# Patient Record
Sex: FEMALE | Race: WHITE | Employment: UNEMPLOYED | URBAN - METROPOLITAN AREA
[De-identification: names, ages, dates, MRNs, and addresses within clinical notes are randomized per-mention and may not be internally consistent; named-entity substitution may affect disease eponyms.]

---
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
## 2021-01-23 ENCOUNTER — TELEPHONE (OUTPATIENT)
Dept: OBGYN CLINIC | Age: 21
End: 2021-01-23

## 2021-01-23 RX ORDER — NORGESTIMATE AND ETHINYL ESTRADIOL 0.25-0.035
1 KIT ORAL DAILY
Qty: 30 TABLET | Refills: 2 | Status: SHIPPED | OUTPATIENT
Start: 2021-01-23 | Stop reason: SDUPTHER

## 2021-01-26 ENCOUNTER — TELEPHONE (OUTPATIENT)
Dept: OBGYN CLINIC | Age: 21
End: 2021-01-26

## 2021-01-26 NOTE — TELEPHONE ENCOUNTER
----- Message from Latoya Sheffield, Jonny Hardinumalaura Chaudhari sent at 1/25/2021  7:03 PM EST -----  Regarding: annual  Please call patient (you may get her mother) and let her know she does need an Annual for further refills  Has not been seen since 11/2019

## 2021-02-08 NOTE — TELEPHONE ENCOUNTER
Requested Prescriptions     Pending Prescriptions Disp Refills    norgestimate-ethinyl estradiol (MONO-LINYAH) 0.25-35 MG-MCG per tablet 30 tablet 2     Sig: Take 1 tablet by mouth daily       Casaedilma Tsang is requesting a refill on mono-linyah.    Last Annual visit:  12/30/2019  Last OB visit: 12/30/2019  Next OB/Office visit:  2/26/2021  Last prescribing provider:  Marquis Boone

## 2021-02-09 RX ORDER — NORGESTIMATE AND ETHINYL ESTRADIOL 0.25-0.035
1 KIT ORAL DAILY
Qty: 30 TABLET | Refills: 2 | Status: SHIPPED | OUTPATIENT
Start: 2021-02-09 | End: 2021-02-26 | Stop reason: SDUPTHER

## 2021-02-26 ENCOUNTER — OFFICE VISIT (OUTPATIENT)
Dept: OBGYN CLINIC | Age: 21
End: 2021-02-26
Payer: COMMERCIAL

## 2021-02-26 VITALS
HEIGHT: 63 IN | SYSTOLIC BLOOD PRESSURE: 100 MMHG | HEART RATE: 79 BPM | DIASTOLIC BLOOD PRESSURE: 69 MMHG | BODY MASS INDEX: 23.23 KG/M2 | WEIGHT: 131.1 LBS

## 2021-02-26 DIAGNOSIS — Z01.419 WOMEN'S ANNUAL ROUTINE GYNECOLOGICAL EXAMINATION: Primary | ICD-10-CM

## 2021-02-26 PROCEDURE — G8484 FLU IMMUNIZE NO ADMIN: HCPCS | Performed by: ADVANCED PRACTICE MIDWIFE

## 2021-02-26 PROCEDURE — 99395 PREV VISIT EST AGE 18-39: CPT | Performed by: ADVANCED PRACTICE MIDWIFE

## 2021-02-26 RX ORDER — NORGESTIMATE AND ETHINYL ESTRADIOL 0.25-0.035
1 KIT ORAL DAILY
Qty: 30 TABLET | Refills: 11 | Status: SHIPPED | OUTPATIENT
Start: 2021-02-26 | End: 2021-08-26 | Stop reason: SDUPTHER

## 2021-02-26 SDOH — ECONOMIC STABILITY: TRANSPORTATION INSECURITY
IN THE PAST 12 MONTHS, HAS LACK OF TRANSPORTATION KEPT YOU FROM MEETINGS, WORK, OR FROM GETTING THINGS NEEDED FOR DAILY LIVING?: NO

## 2021-02-26 SDOH — ECONOMIC STABILITY: FOOD INSECURITY: WITHIN THE PAST 12 MONTHS, YOU WORRIED THAT YOUR FOOD WOULD RUN OUT BEFORE YOU GOT MONEY TO BUY MORE.: NEVER TRUE

## 2021-02-26 SDOH — ECONOMIC STABILITY: INCOME INSECURITY: HOW HARD IS IT FOR YOU TO PAY FOR THE VERY BASICS LIKE FOOD, HOUSING, MEDICAL CARE, AND HEATING?: NOT HARD AT ALL

## 2021-02-26 SDOH — ECONOMIC STABILITY: TRANSPORTATION INSECURITY
IN THE PAST 12 MONTHS, HAS THE LACK OF TRANSPORTATION KEPT YOU FROM MEDICAL APPOINTMENTS OR FROM GETTING MEDICATIONS?: NO

## 2021-02-26 SDOH — ECONOMIC STABILITY: FOOD INSECURITY: WITHIN THE PAST 12 MONTHS, THE FOOD YOU BOUGHT JUST DIDN'T LAST AND YOU DIDN'T HAVE MONEY TO GET MORE.: NEVER TRUE

## 2021-02-26 ASSESSMENT — PATIENT HEALTH QUESTIONNAIRE - PHQ9
SUM OF ALL RESPONSES TO PHQ QUESTIONS 1-9: 0
1. LITTLE INTEREST OR PLEASURE IN DOING THINGS: 0
SUM OF ALL RESPONSES TO PHQ QUESTIONS 1-9: 0

## 2021-02-26 ASSESSMENT — ANXIETY QUESTIONNAIRES
3. WORRYING TOO MUCH ABOUT DIFFERENT THINGS: 0-NOT AT ALL
1. FEELING NERVOUS, ANXIOUS, OR ON EDGE: 1-SEVERAL DAYS
5. BEING SO RESTLESS THAT IT IS HARD TO SIT STILL: 0-NOT AT ALL

## 2021-02-26 ASSESSMENT — ENCOUNTER SYMPTOMS
RESPIRATORY NEGATIVE: 1
GASTROINTESTINAL NEGATIVE: 1

## 2021-06-11 ENCOUNTER — HOSPITAL ENCOUNTER (OUTPATIENT)
Dept: MRI IMAGING | Age: 21
Discharge: HOME | End: 2021-06-13
Payer: COMMERCIAL

## 2021-06-11 DIAGNOSIS — M46.1 SACROILIITIS (HCC): ICD-10-CM

## 2021-06-11 DIAGNOSIS — M25.552 LEFT HIP PAIN: ICD-10-CM

## 2021-06-11 DIAGNOSIS — M54.50 LOW BACK PAIN, UNSPECIFIED BACK PAIN LATERALITY, UNSPECIFIED CHRONICITY, UNSPECIFIED WHETHER SCIATICA PRESENT: ICD-10-CM

## 2021-06-11 DIAGNOSIS — M25.552 BILATERAL HIP PAIN: ICD-10-CM

## 2021-06-11 DIAGNOSIS — M25.551 BILATERAL HIP PAIN: ICD-10-CM

## 2021-06-11 PROCEDURE — 73721 MRI JNT OF LWR EXTRE W/O DYE: CPT

## 2021-07-02 NOTE — ED PROVIDER NOTES
[facility]  ED  Emergency Department Encounter  EmergencyMedicine Resident     Pt Gorge Hernández  MRN: [id number]  [place] 2000  Date of evaluation: 11/5/20  PCP:  Janes Underwood MD    CHIEF COMPLAINT       Chief Complaint   Patient presents with    Headache    Loss of Consciousness       HISTORY OF PRESENT ILLNESS  (Location/Symptom, Timing/Onset, Context/Setting, Quality, Duration, Modifying Factors, Severity.)      Summer Simeon is a 24 y.o. female who presents with headache and syncope. For the past couple months patient has been having intermittent headaches, generalized, throbbing, nonradiating, no associated symptoms. Today patient felt nauseous and \"a little bit off,\" went to the bathroom, walked downstairs, had a syncopal event. Patient denies any new pain afterwards, no new head pain, no neck pain, no injuries. Patient denies fevers, chills, neck stiffness, cough, congestion, visual changes, hearing changes, chest pain, shortness of breath, abdominal pain, vomiting, dysuria, hematuria, diarrhea, constipation, melena, hematochezia, lower extremity pain or swelling, rash. Patient denies any family history of aneurysms, no personal history of aneurysms, no history of bleeding/clotting disorder. Patient is a college student, with stress and anxiety. Patient does take birth control pills. PAST MEDICAL / SURGICAL / SOCIAL / FAMILY HISTORY      has a past medical history of Mild persistent asthma and Seasonal allergic rhinitis. has a past surgical history that includes Collison tooth extraction.       Social History     Socioeconomic History    Marital status: Single     Spouse name: Not on file    Number of children: Not on file    Years of education: Not on file    Highest education level: Not on file   Occupational History    Not on file   Social Needs    Financial resource strain: Not on file    Food insecurity     Worry: Not on file Inability: Not on file    Transportation needs     Medical: Not on file     Non-medical: Not on file   Tobacco Use    Smoking status: Never Smoker    Smokeless tobacco: Never Used   Substance and Sexual Activity    Alcohol use: Never     Frequency: Never    Drug use: Never    Sexual activity: Yes   Lifestyle    Physical activity     Days per week: Not on file     Minutes per session: Not on file    Stress: Not on file   Relationships    Social connections     Talks on phone: Not on file     Gets together: Not on file     Attends Worship service: Not on file     Active member of club or organization: Not on file     Attends meetings of clubs or organizations: Not on file     Relationship status: Not on file    Intimate partner violence     Fear of current or ex partner: Not on file     Emotionally abused: Not on file     Physically abused: Not on file     Forced sexual activity: Not on file   Other Topics Concern    Not on file   Social History Narrative    Not on file       Family History   Problem Relation Age of Onset    Allergies Mother        Allergies:  Patient has no known allergies. Home Medications:  Prior to Admission medications    Medication Sig Start Date End Date Taking?  Authorizing Provider   acetaminophen (TYLENOL) 325 MG tablet Take 1 tablet by mouth every 6 hours as needed for Pain 11/5/20  Yes Larry Johnson MD   ibuprofen (IBU) 400 MG tablet Take 1 tablet by mouth every 6 hours as needed for Pain 11/5/20  Yes Larry Johnson MD   norgestimate-ethinyl estradiol [facility] [place]) 0.25-35 MG-MCG per tablet Take 1 tablet by mouth daily 11/2/20 1/31/21  JEFF Terrazas - CEASAR   montelukast (SINGULAIR) 10 MG tablet Take 1 tablet by mouth daily Diagnosis asthma 9/16/20 12/15/20  Jj Cedeno MD   norgestimate-ethinyl estradiol (ORTHO-CYCLEN, 28,) 0.25-35 MG-MCG per tablet Take 1 tablet by mouth daily 12/30/19   Emiliodomero Boast, APRN - CEASAR   ipratropium (ATROVENT HFA) 17 MCG/ACT inhaler Inhale 1 puff into the lungs 3 times daily 7/22/19 7/21/20  Inocencia Santos MD       REVIEW OF SYSTEMS    (2-9 systems for level 4, 10 or more for level 5)      Review of Systems   Positive for nausea, syncope. Patient denies fevers, chills, neck stiffness, cough, congestion, visual changes, hearing changes, chest pain, shortness of breath, abdominal pain, vomiting, dysuria, hematuria, diarrhea, constipation, melena, hematochezia, lower extremity pain or swelling, rash. PHYSICAL EXAM   (up to 7 for level 4, 8 or more for level 5)      INITIAL VITALS:   BP (!) 106/56   Pulse 74   Temp 98.1 °F (36.7 °C) (Infrared)   Resp 14   LMP 10/29/2020   SpO2 99%     Physical Exam  Constitutional:       General: She is not in acute distress. Appearance: Normal appearance. She is well-developed. She is not ill-appearing, toxic-appearing or diaphoretic. Comments: Patient is alert and oriented, openly communicative, no acute distress. HENT:      Head: Normocephalic and atraumatic. Right Ear: External ear normal.      Left Ear: External ear normal.      Nose: Nose normal. No congestion or rhinorrhea. Mouth/Throat:      Mouth: Mucous membranes are moist.      Pharynx: Oropharynx is clear. No oropharyngeal exudate or posterior oropharyngeal erythema. Eyes:      General:         Right eye: No discharge. Left eye: No discharge. Extraocular Movements: Extraocular movements intact. Pupils: Pupils are equal, round, and reactive to light. Neck:      Musculoskeletal: Normal range of motion and neck supple. No neck rigidity or muscular tenderness. Vascular: No JVD. Trachea: No tracheal deviation. Cardiovascular:      Rate and Rhythm: Normal rate and regular rhythm. Pulses: Normal pulses. Heart sounds: Normal heart sounds. No murmur. No friction rub. No gallop. Pulmonary:      Effort: Pulmonary effort is normal. No respiratory distress.       Breath sounds: Normal breath sounds. No stridor. No wheezing, rhonchi or rales. Chest:      Chest wall: No tenderness. Abdominal:      General: There is no distension. Palpations: Abdomen is soft. There is no mass. Tenderness: There is no abdominal tenderness. There is no right CVA tenderness, left CVA tenderness or guarding. Musculoskeletal: Normal range of motion. General: No signs of injury. Right lower leg: No edema. Left lower leg: No edema. Skin:     General: Skin is warm. Capillary Refill: Capillary refill takes less than 2 seconds. Findings: No rash. Neurological:      General: No focal deficit present. Mental Status: She is alert and oriented to person, place, and time. Cranial Nerves: No cranial nerve deficit. Sensory: No sensory deficit. Motor: No weakness.    Psychiatric:         Mood and Affect: Mood normal.         Behavior: Behavior normal.         DIFFERENTIAL  DIAGNOSIS     PLAN (LABS / IMAGING / EKG):  Orders Placed This Encounter   Procedures    Culture, Urine    CBC Auto Differential    Basic Metabolic Panel w/ Reflex to MG    HCG Qualitative, Serum    Urinalysis, reflex to microscopic    Microscopic Urinalysis    Inpatient consult to Cardiology    EKG 12 Lead       MEDICATIONS ORDERED:  Orders Placed This Encounter   Medications    0.9 % sodium chloride bolus    prochlorperazine (COMPAZINE) injection 10 mg    DISCONTD: diphenhydrAMINE (BENADRYL) injection 25 mg    dexamethasone (DECADRON) injection 4 mg    ketorolac (TORADOL) injection 15 mg    acetaminophen (TYLENOL) 325 MG tablet     Sig: Take 1 tablet by mouth every 6 hours as needed for Pain     Dispense:  60 tablet     Refill:  0    ibuprofen (IBU) 400 MG tablet     Sig: Take 1 tablet by mouth every 6 hours as needed for Pain     Dispense:  60 tablet     Refill:  0       DDX: Vasovagal syncope, cardio neurogenic syncope, pots, dysautonomia, anemia, electrolyte abnormality, pregnancy, UTI, dysrhythmia    DIAGNOSTIC RESULTS / EMERGENCY DEPARTMENT COURSE / MDM   LAB RESULTS:  Results for orders placed or performed during the hospital encounter of 11/05/20   CBC Auto Differential   Result Value Ref Range    WBC 5.2 4.5 - 13.5 k/uL    RBC 4.02 3.95 - 5.11 m/uL    Hemoglobin 11.3 (L) 11.9 - 15.1 g/dL    Hematocrit 33.9 (L) 36.3 - 47.1 %    MCV 84.3 82.6 - 102.9 fL    MCH 28.1 25.2 - 33.5 pg    MCHC 33.3 28.4 - 34.8 g/dL    RDW 11.7 (L) 11.8 - 14.4 %    Platelets 923 586 - 098 k/uL    MPV 8.4 8.1 - 13.5 fL    NRBC Automated 0.0 0.0 per 100 WBC    Differential Type NOT REPORTED     Seg Neutrophils 46 34 - 64 %    Lymphocytes 37 25 - 45 %    Monocytes 9 (H) 2 - 8 %    Eosinophils % 7 (H) 1 - 4 %    Basophils 1 0 - 2 %    Immature Granulocytes 0 0 %    Segs Absolute 2.38 1.80 - 8.00 k/uL    Absolute Lymph # 1.93 1.20 - 5.20 k/uL    Absolute Mono # 0.46 0.10 - 1.40 k/uL    Absolute Eos # 0.34 0.00 - 0.44 k/uL    Basophils Absolute 0.05 0.00 - 0.20 k/uL    Absolute Immature Granulocyte <0.03 0.00 - 0.30 k/uL    WBC Morphology NOT REPORTED     RBC Morphology NOT REPORTED     Platelet Estimate NOT REPORTED    Basic Metabolic Panel w/ Reflex to MG   Result Value Ref Range    Glucose 106 (H) 70 - 99 mg/dL    BUN 8 6 - 20 mg/dL    CREATININE 0.66 0.50 - 0.90 mg/dL    Bun/Cre Ratio NOT REPORTED 9 - 20    Calcium 9.0 8.6 - 10.4 mg/dL    Sodium 135 135 - 144 mmol/L    Potassium 3.8 3.7 - 5.3 mmol/L    Chloride 101 98 - 107 mmol/L    CO2 23 20 - 31 mmol/L    Anion Gap 11 9 - 17 mmol/L    GFR Non-African American  >60 mL/min     Pediatric GFR requires additional information. Refer to [facility] website for calculator.     GFR  NOT REPORTED >60 mL/min    GFR Comment          GFR Staging NOT REPORTED    HCG Qualitative, Serum   Result Value Ref Range    hCG Qual NEGATIVE NEGATIVE   Urinalysis, reflex to microscopic   Result Value Ref Range    Color, UA YELLOW YELLOW    Turbidity UA CLOUDY (A) CLEAR    Glucose, Ur NEGATIVE NEGATIVE    Bilirubin Urine NEGATIVE NEGATIVE    Ketones, Urine NEGATIVE NEGATIVE    Specific [place], UA 1.005 1.005 - 1.030    Urine Hgb NEGATIVE NEGATIVE    pH, UA 5.0 5.0 - 8.0    Protein, UA NEGATIVE NEGATIVE    Urobilinogen, Urine Normal Normal    Nitrite, Urine NEGATIVE NEGATIVE    Leukocyte Esterase, Urine TRACE (A) NEGATIVE    Urinalysis Comments NOT REPORTED    Microscopic Urinalysis   Result Value Ref Range    -          WBC, UA 5 TO 10 0 - 5 /HPF    RBC, UA None 0 - 2 /HPF    Casts UA NOT REPORTED 0 - 2 /LPF    Crystals, UA NOT REPORTED None /HPF    Epithelial Cells UA 2 TO 5 0 - 5 /HPF    Renal Epithelial, UA NOT REPORTED 0 /HPF    Bacteria, UA FEW (A) None    Mucus, UA NOT REPORTED None    Trichomonas, UA NOT REPORTED None    Amorphous, UA NOT REPORTED None    Other Observations UA NOT REPORTED NOT REQ. Yeast, UA NOT REPORTED None       IMPRESSION: 52-year-old female with no significant past medical history presenting with syncopal event. HPI physical exam consistent with vasovagal syncope. Patient is PERC negative, no concern for PE. Will assess urinalysis to look for UTI. Will obtain labs to evaluate electrolyte abnormalities, anemia, pregnancy. No indication for CT imaging at this time. RADIOLOGY:      EKG  EKG Interpretation    Interpreted by me    Rhythm: normal sinus   Rate: normal  Axis: normal  Ectopy: none  Conduction: normal  ST Segments: no acute change  T Waves: no acute change  Q Waves: none    Clinical Impression: no acute changes, potentially short NM. All EKG's are interpreted by the Emergency Department Physician who either signs or Co-signs this chart in the absence of a cardiologist.    EMERGENCY DEPARTMENT COURSE:  Patient came to emergency department, HPI and physical exam were conducted. All nursing notes were reviewed. Patient had episode of sinus tachycardia in the 120s, symptomatic, no abnormal rhythm noted.   This episode is concerning for dysautonomia versus POTS versus cardio neurogenic syncope, will have patient follow-up with cardiology as outpatient. Urinalysis found no evidence of UTI, electrolytes within normal limits, no significant anemia. On reassessment, patient has no headache, completely resolved. Had in-depth discussion with patient and mother regarding follow-up, when to return to the emergency department, understood all. Patient remained stable in the emergency department. Gave strict return precautions to the emergency department and discharge patient. Recommend patient follow-up with primary care provider in the next few days for reassessment. Recommended patient follow-up with cardiology as needed. PROCEDURES:      CONSULTS:  IP CONSULT TO CARDIOLOGY    CRITICAL CARE:  Please see attending note    FINAL IMPRESSION      1. Syncope and collapse    2.  Episodic tension-type headache, not intractable          DISPOSITION / PLAN     DISPOSITION Decision To Discharge 11/05/2020 10:25:58 PM      PATIENT REFERRED TO:  Hiwot Barbosa, [address] [facility] [address] [facility]  [phone]    Schedule an appointment as soon as possible for a visit in 2 days  For reassessment    [facility]  [address] [facility] [address]  [phone]  Go to   As needed, If symptoms worsen    German Solis MD  [address] [facility] [address]  [address]  [phone]    Schedule an appointment as soon as possible for a visit   As needed      DISCHARGE MEDICATIONS:  Discharge Medication List as of 11/5/2020 10:38 PM      START taking these medications    Details   acetaminophen (TYLENOL) 325 MG tablet Take 1 tablet by mouth every 6 hours as needed for Pain, Disp-60 tablet,R-0Print      ibuprofen (IBU) 400 MG tablet Take 1 tablet by mouth every 6 hours as needed for Pain, Disp-60 tablet,R-0Print             Gagandeep Birch MD  Emergency Medicine Resident    (Please note that portions of thisnote were completed with a voice recognition program.  Efforts were made to edit the dictations but occasionally words are mis-transcribed.)        Harika Monahan MD  Resident  11/06/20 2013 [Joint Pain] : joint pain [Negative] : Heme/Lymph [FreeTextEntry9] : neck pain, back pain

## 2021-07-16 ENCOUNTER — HOSPITAL ENCOUNTER (OUTPATIENT)
Age: 21
Discharge: HOME | End: 2021-07-16

## 2021-07-16 PROCEDURE — 86481 TB AG RESPONSE T-CELL SUSP: CPT

## 2021-07-20 LAB — T-SPOT TB TEST: NORMAL

## 2021-08-25 ENCOUNTER — TELEPHONE (OUTPATIENT)
Dept: OBGYN CLINIC | Age: 21
End: 2021-08-25

## 2021-08-26 RX ORDER — NORGESTIMATE AND ETHINYL ESTRADIOL 0.25-0.035
1 KIT ORAL DAILY
Qty: 90 TABLET | Refills: 1 | Status: SHIPPED | OUTPATIENT
Start: 2021-08-26 | End: 2022-01-06 | Stop reason: SDUPTHER

## 2021-10-12 ENCOUNTER — OFFICE VISIT (OUTPATIENT)
Dept: FAMILY MEDICINE CLINIC | Age: 21
End: 2021-10-12
Payer: COMMERCIAL

## 2021-10-12 VITALS
WEIGHT: 131 LBS | SYSTOLIC BLOOD PRESSURE: 118 MMHG | BODY MASS INDEX: 23.21 KG/M2 | TEMPERATURE: 97.2 F | HEART RATE: 71 BPM | OXYGEN SATURATION: 98 % | DIASTOLIC BLOOD PRESSURE: 68 MMHG

## 2021-10-12 DIAGNOSIS — Z23 NEED FOR IMMUNIZATION AGAINST INFLUENZA: ICD-10-CM

## 2021-10-12 DIAGNOSIS — Z00.00 ENCOUNTER FOR WELL ADULT EXAM WITHOUT ABNORMAL FINDINGS: Primary | ICD-10-CM

## 2021-10-12 PROCEDURE — 90471 IMMUNIZATION ADMIN: CPT | Performed by: NURSE PRACTITIONER

## 2021-10-12 PROCEDURE — 99395 PREV VISIT EST AGE 18-39: CPT | Performed by: NURSE PRACTITIONER

## 2021-10-12 PROCEDURE — 90674 CCIIV4 VAC NO PRSV 0.5 ML IM: CPT | Performed by: NURSE PRACTITIONER

## 2021-10-12 ASSESSMENT — PATIENT HEALTH QUESTIONNAIRE - PHQ9
SUM OF ALL RESPONSES TO PHQ9 QUESTIONS 1 & 2: 2
2. FEELING DOWN, DEPRESSED OR HOPELESS: 2
SUM OF ALL RESPONSES TO PHQ QUESTIONS 1-9: 2
SUM OF ALL RESPONSES TO PHQ QUESTIONS 1-9: 2
1. LITTLE INTEREST OR PLEASURE IN DOING THINGS: 0
SUM OF ALL RESPONSES TO PHQ QUESTIONS 1-9: 2

## 2021-10-12 NOTE — PROGRESS NOTES
Cecilio Estrella, FNP-C  [address]  [id number] [facility]. [address]  [phone]  [phone]    Krystle Jones is a 22 y.o. 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        Patient Active Problem List   Diagnosis    Seasonal allergic rhinitis     Past Medical History:   Diagnosis Date    Mild persistent asthma     Seasonal allergic rhinitis       Past Surgical History:   Procedure Laterality Date    WISDOM TOOTH EXTRACTION       Family History   Problem Relation Age of Onset    Allergies Mother     Cancer Father 50        brain     Social History     Tobacco Use    Smoking status: Never Smoker    Smokeless tobacco: Never Used   Substance Use Topics    Alcohol use: Never     ALLERGIES:  No Known Allergies       Subjective     · Constitutional:  Negative for activity change, appetite change,unexpected weight change, chills, fever, and fatigue. · HENT: Negative for ear pain, sore throat,  Rhinorrhea, sinus pain, sinus pressure, congestion. · Eyes:  Negative for pain and discharge. · Respiratory:  Negative for chest tightness, shortness of breath, wheezing, and cough. · Cardiovascular:  Negative for chest pain, palpitations and leg swelling. · Gastrointestinal: Negative for abdominal pain, blood in stool, constipation,diarrhea, nausea and vomiting. · Endocrine: Negative for cold intolerance, heat intolerance, polydipsia, polyphagia and polyuria. · Genitourinary: Negative for difficulty urinating, dysuria, flank pain, frequency, hematuria and urgency. · Musculoskeletal: Negative for arthralgias, back pain, joint swelling, myalgias, neck pain and neck stiffness. · Skin: Negative for rash and wound.    · Allergic/Immunologic: Negative for environmental allergies and food allergies. · Neurological:  Negative for dizziness, light-headedness, numbness and headaches. · Hematological:  Negative for adenopathy. Does not bruise/bleed easily. · Psychiatric/Behavioral: Negative for self-injury, sleep disturbance and suicidal ideas. Objective     PHYSICAL EXAM:   · Constitutional: Leela Calzada is oriented to person, place, and time. Vital signs are normal. Appears well-developed and well-nourished. · HEENT:   · Head: Normocephalic and atraumatic. Right Ear: Hearing and external ear normal. TM bulging, no erythema  Canal normal  · Left Ear: Hearing and external ear normal. TM normal Canal normal  · Nose: Nares normal. Septum midline. No drainage or sinus tenderness. Mucosa pink and moist  · Mouth/Throat: Oropharynx- No erythema, no exudate. Uvula midline, no erythema, no edema. Mucous membranes are pink and moist.   · Eyes:PERRL, EOMI, Conjunctiva normal, No discharge. · Neck: Full passive range of motion. Non-tender on palpation. Neck supple. No thyromegaly present. Trachea normal.  · Cardiovascular: Normal rate, regular rhythm, S1, S2, no murmur, no gallop, no friction rub, intact distal pulses. No carotid bruit. No lower extremity edema  · Pulmonary/Chest: Breath sounds are clear throughout, No respiratory distress, No wheezing, No chest tenderness. Effort normal  · Musculoskeletal: Extremities appear regular and symmetric. No evident masses, lesions, foreign bodies, or other abnormalities. No edema. No tenderness on palpation. Joints are stable. Full ROM, strength and tone are within normal limits. · Lymphadenopathy: No lymphadenopathy noted. · Neurological: Alert and oriented to person, place, and time. Normal motor function, Normal sensory function, No focal deficits noted. He has normal strength. · Skin: Skin is warm, dry and intact. No obvious lesions on exposed skin  · Psychiatric: Normal mood and affect.  Speech is normal and behavior is normal.     Nursing note and vitals reviewed. Blood pressure 118/68, pulse 71, temperature 97.2 °F (36.2 °C), temperature source Temporal, weight 131 lb (59.4 kg), SpO2 98 %, not currently breastfeeding. Body mass index is 23.21 kg/m². Wt Readings from Last 3 Encounters:   10/12/21 131 lb (59.4 kg)   02/26/21 131 lb 1.6 oz (59.5 kg) (56 %, Z= 0.16)*   01/20/20 132 lb 3.2 oz (60 kg) (63 %, Z= 0.34)*     * Growth percentiles are based on CDC (Girls, 2-20 Years) data. BP Readings from Last 3 Encounters:   10/12/21 118/68   02/26/21 100/69   11/05/20 (!) 106/56       No results found for this visit on 10/12/21. Completed Orders/Prescriptions   No orders of the defined types were placed in this encounter. Immunizations Administered     Name Date Dose Route    Influenza, MDCK Quadv, IM, PF (Flucelvax 2 yrs and older) 10/12/2021 0.5 mL Intramuscular    Site: Deltoid- Left    Lot: [id number]    NDC: 45877-089-79          AssessmentPlan/Medical Decision Making     1. Encounter for well adult exam without abnormal findings    2. Need for immunization against influenza  - INFLUENZA, MDCK QUADV, 2 YRS AND OLDER, IM, PF, PREFILL SYR OR SDV, 0.5ML (FLUCELVAX QUADV, PF)      Return in about 1 year (around 10/12/2022). 1.  Yvonne received counseling on the following healthy behaviors: nutrition, exercise and medication adherence  2. Patient given educational materials - see patient instructions  3. Was a self-tracking handout given in paper form or via [employer]? No  If yes, see orders or list here. 4.  Discussed use, benefit, and side effects of prescribed medications. Barriers to medication compliance addressed. All patient questions answered. Pt voiced understanding. 5.  Reviewed prior labs, imaging, consultation, follow up, and health maintenance  6. Continue current medications, diet and exercise. 7. Discussed use, benefit, and side effects of prescribed medications.   Barriers to medication compliance addressed. All her questions were answered. Pt voiced understanding. Елена Lenz will continue current medications, diet and exercise. Of the 25 minute duration appointment visit, Mortimer Knee, CNP spent at least 50% of the face-to-face time in counseling, explanation of diagnosis, planning of further management, and answering all questions. Signed:  Mortimer Knee, CNP    This note is created with the assistance of a speech-recognition program.  While intending to generate a document that actually reflects the content of the visit, no guarantees can be provided that every mistake has been identified and corrected by editing.

## 2021-10-15 NOTE — PROGRESS NOTES
Date of Visit: 2/26/2021    SUBJECTIVE:  Chief Complaint   Patient presents with    Annual Exam    Medication Refill     bc       HPI  Arabella Ruiz arrives for annual Well Woman exam.  Patient denies concerns today. Her Patient's last menstrual period was 02/17/2021 (exact date). .   She denies irregular/heavy bleeding and dysmenorrhea. Her periods are regular and last 5 days. She describes them as moderate. Her bowel habits are regular. She denies any bloating. She denies dysuria. She denies urinary leaking. She denies vaginal discharge. She is sexually active with single partner, contraception - OCP (estrogen/progesterone). No new sexual partner (in past 3 months):    is not desiring pregnancy. Depression/Anxiety screen:  PHQ Scores 2/26/2021 9/30/2019   PHQ2 Score 0 0   PHQ9 Score 0 0     Interpretation of Total Score Depression Severity: 1-4 = Minimal depression, 5-9 = Mild depression, 10-14 = Moderate depression, 15-19 = Moderately severe depression, 20-27 = Severe depression  WILFREDO 7 SCORE 2/26/2021   WILFREDO-7 Total Score 2     Interpretation of WILFREDO-7 score: 5-9 = mild anxiety, 10-14 = moderate anxiety, 15+ = severe anxiety. Recommend referral to behavioral health for scores 10 or greater. Review of Systems   Constitutional: Negative. Respiratory: Negative. Cardiovascular: Negative. Gastrointestinal: Negative. Genitourinary: Negative. Psychiatric/Behavioral: Negative.         PREVENTIVE HEALTH SCREENING:   Date of last pap:  NA - deferred        HPV typing/date: NA    Gardisil vaccine:   done    Date of last mammogram: NA  Date of last DEXA scan: NA  Date of last colonoscopy: NA    History of Gestational Diabetes: NA     If Yes, NA  History of Pre-Eclampsia: NA    Preventive screening through PCP: Yes    Family history of Breast, Ovarian, Colon or Uterine Cancer:  Yes     See updated review in Media     Genetic counseling:  Declined      GYNECOLOGIC HISTORY:  Menarche: Yes  Menopause: NA  HT use: NA    STD history: No     Contraception: PO Mono-Linyah  Permanent sterilization: NA      Physical Exam:     Chaperone for Intimate Exam   Chaperone was offered as part of the rooming process. Patient declined and agrees to continue with exam without a chaperone.  Chaperone: NA    Constitutional:   Blood pressure 100/69, pulse 79, height 5' 3\" (1.6 m), weight 131 lb 1.6 oz (59.5 kg), last menstrual period 02/17/2021, not currently breastfeeding. Wt Readings from Last 3 Encounters:   02/26/21 131 lb 1.6 oz (59.5 kg) (56 %, Z= 0.16)*   01/20/20 132 lb 3.2 oz (60 kg) (63 %, Z= 0.34)*   12/30/19 130 lb 3.2 oz (59.1 kg) (60 %, Z= 0.26)*     * Growth percentiles are based on [facility] (Girls, 2-20 Years) data. General Appearance: This is a well-developed, well-nourished and well-groomed female. Alert and not in distress  Skin:  Inspection of the skin revealed no rashes or lesions  Neck and EENT:  No eye discharge and sclera non-icteric  Lips, teeth and gums without lesions and normal dentition  Nares are patent without discharge  Normal external ears are present with no hearing loss  The neck was supple with full range of motion and no masses. The thyroid was not enlarged and had no masses. No enlarged cervical lymph nodes  Lymphatic:   No lymph nodes were palpable in neck, axilla or groin   Neurologic:    Normal speech, no focal findings or movement disorder noted  Respiratory: The lungs were clear to auscultation bilaterally. There were no rales, rhonchi or wheezes. There was good respiratory effort. Cardiovascular: The heart was in a regular rhythm and rate was normal.  No murmur or extra sounds were noted. Breast:  The breasts are normal size and symmetrical.  There are no skin changes with position changes. The nipples are without deviations or discharge. No masses were palpated. No axillary or supraclavicular lymphadenopathy is present.   Back:  Straight with no CVA tenderness present  Abdomen: The abdomen is soft and non-tender. There was no guarding, rebound or rigidity. The bladder was without fullness or tenderness. No hernias were appreciated. Pelvic:  Deferred  Musculoskeletal:   Normal gait. No contractions with normal movement of all extremities. Range of motion appropriate for patient's age. Extremities:  No cyanosis or edema present. No calf tenderness  Neurologic:    Normal speech, no focal findings or movement disorder noted  Psychiatric:  Alert, oriented to time, place, person and situation. There are no mood or affect changes. ASSESSMENT/PLAN:  1.  Women's annual routine gynecological examination  Birth control prescription refill sent     AGE <39 Counseling and Evaluation  Sexuality/Reproductive Planning  [x] Discussed birth control as needed and reviewed ACHES; refills given   [x] Reproductive plan discussed  [x] Preconception/genetic counseling  [x] Sexual function  [x] Discussed STI counseling/prevention: Offered and declined  [x] Discussed Gardisil: Received  Fitness/Nutrition  [x] Physical activity  [] Folic Acid supplementation discussed  Health/Risk Assessment  [x] Discussed annual Well-Woman exams every year; Pap per ASCCP guidelines and testing  [x] Breast self-awareness reinforced  [x] Hereditary Breast/Ovarian Cancer screening   [] Hepatitis C screening  [x] Tobacco & Secondary smoke risks: Non-smoker  [x] Health maintenance through PCP  Psychosocial Evaluation  [] Intimate partner violence screening  [] Depression/Anxiety screening  Cardiovascular Risk Factors  [] Family history  [] Hypertension  [] Dyslipidemia  [] Obesity  [] Diabetes Mellitus  [] Personal hx of PreE, GDM, or PIH  [x] Lifestyle           The patient, Anil Dos Santos,  was seen  for the visit on this date of service by the [facility]  The time component, involved both face-to-face (counseling and education)  and non face-to-face time (care coordination), spent in determining the total time component. She was also counseled on her preventative health maintenance recommendations and follow-up.     Electronically Signed by JEFF Inman CNM No

## 2021-11-18 DIAGNOSIS — H10.9 BACTERIAL CONJUNCTIVITIS: Primary | ICD-10-CM

## 2021-11-18 NOTE — TELEPHONE ENCOUNTER
----- Message from Piper Card sent at 11/18/2021  1:41 PM EST -----  Subject: Message to Provider    QUESTIONS  Information for Provider? pt woke up to day with what she is thinking is   pink eye, white of rt eye are turning red, feels like something is in the   rt eye, has drainage with it. pt would like an antibodies called in.   ---------------------------------------------------------------------------  --------------  CALL BACK INFO  What is the best way for the office to contact you? OK to leave message on   voicemail  Preferred Call Back Phone Number? [phone]  ---------------------------------------------------------------------------  --------------  SCRIPT ANSWERS  Relationship to Patient?  Self

## 2021-11-19 RX ORDER — POLYMYXIN B SULFATE AND TRIMETHOPRIM 1; 10000 MG/ML; [USP'U]/ML
1 SOLUTION OPHTHALMIC EVERY 4 HOURS
Qty: 10 ML | Refills: 0 | Status: SHIPPED | OUTPATIENT
Start: 2021-11-19 | End: 2021-11-29

## 2021-11-19 NOTE — TELEPHONE ENCOUNTER
I have the antibiotic pended, but there is no local pharmacy on file to send it to - please call the patient

## 2021-11-19 NOTE — TELEPHONE ENCOUNTER
Ok. [place] Please see previous response. I pended the antibiotic last night - there is no pharmacy to send it to.

## 2021-12-22 ENCOUNTER — TELEPHONE (OUTPATIENT)
Dept: FAMILY MEDICINE CLINIC | Age: 21
End: 2021-12-22

## 2021-12-22 NOTE — TELEPHONE ENCOUNTER
Pt is a nursing student at [employer]. She has been told that she needs a rapid drug screen done before  January. 4th. Please advise.

## 2021-12-23 NOTE — TELEPHONE ENCOUNTER
The school has contracts with sites - she will need to contact her school for further direction. If it is done at the office, she will be responsible for paying for it and it could be costly.

## 2022-01-05 ENCOUNTER — PATIENT MESSAGE (OUTPATIENT)
Dept: OBGYN CLINIC | Age: 22
End: 2022-01-05

## 2022-01-05 NOTE — TELEPHONE ENCOUNTER
From: Brenda Loco  To: JEFF DiazM  Sent: 1/5/2022 4:56 PM EST  Subject: birth control    I need a 3 month prescription with refills please for my prescription number [id number], monolinyah (not generic) sent to [facility], phone number [phone]. I need it by the week of January 17th.      Thanks in advance,  Elliott Horvath

## 2022-01-06 RX ORDER — NORGESTIMATE AND ETHINYL ESTRADIOL 0.25-0.035
1 KIT ORAL DAILY
Qty: 90 TABLET | Refills: 1 | Status: SHIPPED | OUTPATIENT
Start: 2022-01-06 | End: 2022-03-01 | Stop reason: SDUPTHER

## 2022-03-01 ENCOUNTER — OFFICE VISIT (OUTPATIENT)
Dept: OBGYN CLINIC | Age: 22
End: 2022-03-01
Payer: COMMERCIAL

## 2022-03-01 VITALS
HEART RATE: 83 BPM | WEIGHT: 130.3 LBS | BODY MASS INDEX: 23.09 KG/M2 | HEIGHT: 63 IN | DIASTOLIC BLOOD PRESSURE: 80 MMHG | SYSTOLIC BLOOD PRESSURE: 112 MMHG

## 2022-03-01 DIAGNOSIS — N94.6 DYSMENORRHEA: ICD-10-CM

## 2022-03-01 DIAGNOSIS — Z30.41 ENCOUNTER FOR SURVEILLANCE OF CONTRACEPTIVE PILLS: ICD-10-CM

## 2022-03-01 DIAGNOSIS — Z01.419 WOMEN'S ANNUAL ROUTINE GYNECOLOGICAL EXAMINATION: Primary | ICD-10-CM

## 2022-03-01 PROCEDURE — 99395 PREV VISIT EST AGE 18-39: CPT | Performed by: ADVANCED PRACTICE MIDWIFE

## 2022-03-01 RX ORDER — HYDROXYZINE HYDROCHLORIDE 10 MG/1
10 TABLET, FILM COATED ORAL 3 TIMES DAILY PRN
COMMUNITY

## 2022-03-01 RX ORDER — NORGESTIMATE AND ETHINYL ESTRADIOL 0.25-0.035
1 KIT ORAL DAILY
Qty: 90 TABLET | Refills: 3 | Status: SHIPPED | OUTPATIENT
Start: 2022-03-01

## 2022-03-01 RX ORDER — SERTRALINE HYDROCHLORIDE 25 MG/1
25 TABLET, FILM COATED ORAL DAILY
COMMUNITY
End: 2022-07-13

## 2022-03-01 ASSESSMENT — ANXIETY QUESTIONNAIRES
1. FEELING NERVOUS, ANXIOUS, OR ON EDGE: 0
7. FEELING AFRAID AS IF SOMETHING AWFUL MIGHT HAPPEN: 0
6. BECOMING EASILY ANNOYED OR IRRITABLE: 0
5. BEING SO RESTLESS THAT IT IS HARD TO SIT STILL: 0
3. WORRYING TOO MUCH ABOUT DIFFERENT THINGS: 0
2. NOT BEING ABLE TO STOP OR CONTROL WORRYING: 0
4. TROUBLE RELAXING: 0
GAD7 TOTAL SCORE: 0

## 2022-03-01 ASSESSMENT — ENCOUNTER SYMPTOMS
ALLERGIC/IMMUNOLOGIC NEGATIVE: 1
EYES NEGATIVE: 1
GASTROINTESTINAL NEGATIVE: 1
RESPIRATORY NEGATIVE: 1

## 2022-03-01 ASSESSMENT — PATIENT HEALTH QUESTIONNAIRE - PHQ9
SUM OF ALL RESPONSES TO PHQ QUESTIONS 1-9: 0
SUM OF ALL RESPONSES TO PHQ9 QUESTIONS 1 & 2: 0
SUM OF ALL RESPONSES TO PHQ QUESTIONS 1-9: 0
2. FEELING DOWN, DEPRESSED OR HOPELESS: 0
1. LITTLE INTEREST OR PLEASURE IN DOING THINGS: 0

## 2022-03-01 NOTE — PROGRESS NOTES
[facility] AND GYNECOLOGY  [facility]   [address] [place] [address]  Dept: [phone]    Patient Name: Jossue Cordova  Patient Age: 22 y.o. Date of Visit: 3/1/2022    Subjective  Chief Complaint   Patient presents with    Annual Exam     HPI:  Jossue Cordova is a 22 y.o. female who arrives to office as an established patient for annual exam and med refill. Patient has no concerns today. Patient reports is sexually active with 1 male partner(s). Patient denies pain with sex. Patient denies a history of sexually transmitted infection(s). Patient does not want screening for sexually transmitted infection(s). Reports is on contraception - OCPs. No issues and would like refills. Review of Systems   Constitutional: Negative. HENT: Negative. Eyes: Negative. Respiratory: Negative. Cardiovascular: Negative. Gastrointestinal: Negative. Endocrine: Negative. Genitourinary: Negative for dyspareunia, flank pain, frequency, genital sores, menstrual problem (dysmenorrhea improved with OCP), pelvic pain, vaginal bleeding and vaginal pain. Musculoskeletal: Negative. Skin: Negative. Allergic/Immunologic: Negative. Neurological: Negative. Hematological: Negative. Psychiatric/Behavioral: Negative.       Preventive Health Screening:   Date of last pap: N/A  History of abnormal pap: N/A          HPV typing/date: N/A   Date of last mammogram: N/A   Date of last DEXA scan: N/A   Date of last colonoscopy: N/A    History of Gestational Diabetes: No     If Yes, Glucose screening ordered: N/A    Preventive screening: Yes, with PCP    Family history of Breast, Ovarian, Colon or Uterine Cancer:  Negative screening      If Yes see scanned worksheet    PHQ Scores 3/1/2022 10/12/2021 2/26/2021 9/30/2019   PHQ2 Score 0 2 0 0   PHQ9 Score 0 2 0 0     Interpretation of Total Score Depression Severity: 1-4 = Minimal depression, 5-9 = Mild depression, 10-14 = Moderate depression, 15-19 = Moderately severe depression, 20-27 = Severe depression  WILFREDO 7 SCORE 3/1/2022 2/26/2021   WILFREDO-7 Total Score 0 -   WILFREDO-7 Total Score - 2     Interpretation of WILFREDO-7 score: 5-9 = mild anxiety, 10-14 = moderate anxiety, 15+ = severe anxiety. Recommend referral to behavioral health for scores 10 or greater. Social Determinants of Health:   Social History     Tobacco Use   Smoking Status Never Smoker   Smokeless Tobacco Never Used      Social History     Substance and Sexual Activity   Alcohol Use Never      Social History     Substance and Sexual Activity   Drug Use Never         Financial Resource Strain:     Difficulty of Paying Living Expenses: Not on file         Food Insecurity:     Worried About Running Out of Food in the Last Year: Not on file    Beti of Food in the Last Year: Not on file         Transportation Needs:     Lack of Transportation (Medical): Not on file    Lack of Transportation (Non-Medical): Not on file         Intimate Partner Violence:     Fear of Current or Ex-Partner: Not on file    Emotionally Abused: Not on file    Physically Abused: Not on file    Sexually Abused: Not on file       Objective  Blood pressure 112/80, pulse 83, height 5' 3\" (1.6 m), weight 130 lb 4.8 oz (59.1 kg), last menstrual period 02/16/2022, not currently breastfeeding. Patient's last menstrual period was 02/16/2022 (exact date). Body mass index is 23.08 kg/m².    Current Outpatient Medications on File Prior to Visit   Medication Sig Dispense Refill    sertraline (ZOLOFT) 25 MG tablet Take 25 mg by mouth daily      hydrOXYzine (ATARAX) 10 MG tablet Take 10 mg by mouth 3 times daily as needed for Itching      Cetirizine HCl (ZYRTEC ALLERGY PO) Take by mouth      Montelukast Sodium (SINGULAIR PO) Take by mouth (Patient not taking: Reported on 3/1/2022)      [DISCONTINUED] norgestimate-ethinyl estradiol ([address]) 0.25-35 MG-MCG per tablet Take 1 tablet by mouth daily 30 tablet 2     No current facility-administered medications on file prior to visit. Past Medical History:   Diagnosis Date    Mild persistent asthma     Seasonal allergic rhinitis      Gynecologic History:  Menarche: 14  Monthly menses (days): regular   Length: 5 days  Flow: moderate    Gardasil Series: Yes    Sexually active: Yes  New Sex Partner within 3 months: No  Domestic Violence Screening: negative    STD history: No     Birth control method: Yes OCP    Physical Exam  Vitals reviewed. Constitutional:       General: She is not in acute distress. Appearance: Normal appearance. She is normal weight. She is not ill-appearing, toxic-appearing or diaphoretic. Cardiovascular:      Rate and Rhythm: Normal rate. Pulmonary:      Effort: Pulmonary effort is normal. No respiratory distress. Chest:      Comments: deferred  Genitourinary:     Comments: deferred  Musculoskeletal:      Cervical back: Normal range of motion. Skin:     General: Skin is warm and dry. Capillary Refill: Capillary refill takes less than 2 seconds. Neurological:      Mental Status: She is alert and oriented to person, place, and time. Mental status is at baseline. Psychiatric:         Mood and Affect: Mood normal.         Behavior: Behavior normal.         Thought Content: Thought content normal.         Judgment: Judgment normal.       Assessment/Plan:  Women's annual routine gynecological examination:  General Health:  [x] Alcohol screening & counseling -negative  [x] Blood pressure screening: normal  [x] Contraceptive counseling & methods: OCP  [x] Depression Screening: Negative  [] Diabetes Screening   [] Folic acid supplementation  [] Healthful diet & activity counseling  [x] Interpersonal violence screening: Negative  [] Lipid screening: not done  [x] Obesity Screening: Body mass index is 23.08 kg/m².   [] Osteoporosis screening  [x] Substance use screening &

## 2022-07-13 ENCOUNTER — OFFICE VISIT (OUTPATIENT)
Dept: FAMILY MEDICINE CLINIC | Age: 22
End: 2022-07-13
Payer: COMMERCIAL

## 2022-07-13 ENCOUNTER — HOSPITAL ENCOUNTER (OUTPATIENT)
Age: 22
Setting detail: SPECIMEN
Discharge: HOME | End: 2022-07-13

## 2022-07-13 VITALS
TEMPERATURE: 97.6 F | OXYGEN SATURATION: 99 % | HEIGHT: 63 IN | DIASTOLIC BLOOD PRESSURE: 68 MMHG | WEIGHT: 133 LBS | BODY MASS INDEX: 23.57 KG/M2 | HEART RATE: 76 BPM | SYSTOLIC BLOOD PRESSURE: 108 MMHG

## 2022-07-13 DIAGNOSIS — Z23 IMMUNIZATION DUE: ICD-10-CM

## 2022-07-13 DIAGNOSIS — Z01.84 IMMUNITY STATUS TESTING: ICD-10-CM

## 2022-07-13 DIAGNOSIS — Z91.09 ENVIRONMENTAL ALLERGIES: ICD-10-CM

## 2022-07-13 DIAGNOSIS — Z00.00 ENCOUNTER FOR WELL ADULT EXAM WITHOUT ABNORMAL FINDINGS: Primary | ICD-10-CM

## 2022-07-13 DIAGNOSIS — Z13.31 DEPRESSION SCREENING NEGATIVE: ICD-10-CM

## 2022-07-13 PROCEDURE — G0444 DEPRESSION SCREEN ANNUAL: HCPCS | Performed by: NURSE PRACTITIONER

## 2022-07-13 PROCEDURE — 99395 PREV VISIT EST AGE 18-39: CPT | Performed by: NURSE PRACTITIONER

## 2022-07-13 PROCEDURE — 90471 IMMUNIZATION ADMIN: CPT | Performed by: NURSE PRACTITIONER

## 2022-07-13 PROCEDURE — 90715 TDAP VACCINE 7 YRS/> IM: CPT | Performed by: NURSE PRACTITIONER

## 2022-07-13 RX ORDER — MONTELUKAST SODIUM 10 MG/1
10 TABLET ORAL DAILY
Qty: 90 TABLET | Refills: 1 | Status: SHIPPED | OUTPATIENT
Start: 2022-07-13

## 2022-07-13 RX ORDER — MONTELUKAST SODIUM 10 MG/1
10 TABLET ORAL DAILY
Qty: 30 TABLET | Refills: 0 | Status: SHIPPED | OUTPATIENT
Start: 2022-07-13

## 2022-07-13 SDOH — ECONOMIC STABILITY: FOOD INSECURITY: WITHIN THE PAST 12 MONTHS, THE FOOD YOU BOUGHT JUST DIDN'T LAST AND YOU DIDN'T HAVE MONEY TO GET MORE.: NEVER TRUE

## 2022-07-13 SDOH — ECONOMIC STABILITY: FOOD INSECURITY: WITHIN THE PAST 12 MONTHS, YOU WORRIED THAT YOUR FOOD WOULD RUN OUT BEFORE YOU GOT MONEY TO BUY MORE.: NEVER TRUE

## 2022-07-13 ASSESSMENT — SOCIAL DETERMINANTS OF HEALTH (SDOH): HOW HARD IS IT FOR YOU TO PAY FOR THE VERY BASICS LIKE FOOD, HOUSING, MEDICAL CARE, AND HEATING?: NOT HARD AT ALL

## 2022-07-13 ASSESSMENT — PATIENT HEALTH QUESTIONNAIRE - PHQ9
SUM OF ALL RESPONSES TO PHQ QUESTIONS 1-9: 0
2. FEELING DOWN, DEPRESSED OR HOPELESS: 0
SUM OF ALL RESPONSES TO PHQ QUESTIONS 1-9: 0
1. LITTLE INTEREST OR PLEASURE IN DOING THINGS: 0
SUM OF ALL RESPONSES TO PHQ9 QUESTIONS 1 & 2: 0
SUM OF ALL RESPONSES TO PHQ QUESTIONS 1-9: 0
SUM OF ALL RESPONSES TO PHQ QUESTIONS 1-9: 0

## 2022-07-13 ASSESSMENT — VISUAL ACUITY: OU: 1

## 2022-07-13 NOTE — PATIENT INSTRUCTIONS
Well Visit, Ages 25 to 48: Care Instructions  Overview     Well visits can help you stay healthy. Your doctor has checked your overall health and may have suggested ways to take good care of yourself. Your doctor also may have recommended tests. At home, you can help prevent illness withhealthy eating, regular exercise, and other steps. Follow-up care is a key part of your treatment and safety. Be sure to make and go to all appointments, and call your doctor if you are having problems. It's also a good idea to know your test results and keep alist of the medicines you take. How can you care for yourself at home?  Get screening tests that you and your doctor decide on. Screening helps find diseases before any symptoms appear.  Eat healthy foods. Choose fruits, vegetables, whole grains, protein, and low-fat dairy foods. Limit fat, especially saturated fat. Reduce salt in your diet.  Limit alcohol. If you are a man, have no more than 2 drinks a day or 14 drinks a week. If you are a woman, have no more than 1 drink a day or 7 drinks a week.  Get at least 30 minutes of physical activity on most days of the week. Walking is a good choice. You also may want to do other activities, such as running, swimming, cycling, or playing tennis or team sports. Discuss any changes in your exercise program with your doctor.  Reach and stay at a healthy weight. This will lower your risk for many problems, such as obesity, diabetes, heart disease, and high blood pressure.  Do not smoke or allow others to smoke around you. If you need help quitting, talk to your doctor about stop-smoking programs and medicines. These can increase your chances of quitting for good.  Care for your mental health. It is easy to get weighed down by worry and stress. Learn strategies to manage stress, like deep breathing and mindfulness, and stay connected with your family and community.  If you find you often feel sad or hopeless, talk with your doctor. Treatment can help.  Talk to your doctor about whether you have any risk factors for sexually transmitted infections (STIs). You can help prevent STIs if you wait to have sex with a new partner (or partners) until you've each been tested for STIs. It also helps if you use condoms (male or female condoms) and if you limit your sex partners to one person who only has sex with you. Vaccines are available for some STIs, such as HPV.  Use birth control if it's important to you to prevent pregnancy. Talk with your doctor about the choices available and what might be best for you.  If you think you may have a problem with alcohol or drug use, talk to your doctor. This includes prescription medicines (such as amphetamines and opioids) and illegal drugs (such as cocaine and methamphetamine). Your doctor can help you figure out what type of treatment is best for you.  Protect your skin from too much sun. When you're outdoors from 10 a.m. to 4 p.m., stay in the shade or cover up with clothing and a hat with a wide brim. Wear sunglasses that block UV rays. Even when it's cloudy, put broad-spectrum sunscreen (SPF 30 or higher) on any exposed skin.  See a dentist one or two times a year for checkups and to have your teeth cleaned.  Wear a seat belt in the car. When should you call for help? Watch closely for changes in your health, and be sure to contact your doctor if you have any problems or symptoms that concern you. Where can you learn more? Go to https://cristiano.healthSocialScipartners. org and sign in to your Tinypass account. Enter P072 in the [facility] box to learn more about \"Well Visit, Ages 25 to 48: Care Instructions. \"     If you do not have an account, please click on the \"Sign Up Now\" link. Current as of: October 6, 2021               Content Version: 13.3  © 2968-3288 Healthwise, Incorporated. Care instructions adapted under license by [facility] ([facility]).  If you have example, you can exercise 3 times a day for 10 or 20 minutes. Moderate exercise is safe for most people, but it is always a good idea to talk to your doctor before starting an exercise program.   Keep moving. Carlotta [place] the lawn, work in the garden, or [email or web address]. Take the stairs instead of the elevator at work.  If you smoke, quit. People who smoke have an increased risk for heart attack, stroke, cancer, and other lung illnesses. Quitting is hard, but there are ways to boost your chance of quitting tobacco for good. ? Use nicotine gum, patches, or lozenges. ? Ask your doctor about stop-smoking programs and medicines. ? Keep trying. In addition to reducing your risk of diseases in the future, you will notice some benefits soon after you stop using tobacco. If you have shortness of breath or asthma symptoms, they will likely get better within a few weeks after you quit.  Limit how much alcohol you drink. Moderate amounts of alcohol (up to 2 drinks a day for men, 1 drink a day for women) are okay. But drinking too much can lead to liver problems, high blood pressure, and other health problems. Family health  If you have a family, there are many things you can do together to improve yourhealth.  Eat meals together as a family as often as possible.  Eat healthy foods. This includes fruits, vegetables, lean meats and dairy, and whole grains.  Include your family in your fitness plan. Most people think of activities such as jogging or tennis as the way to fitness, but there are many ways you and your family can be more active. Anything that makes you breathe hard and gets your heart pumping is exercise. Here are some tips:  ? Walk to do errands or to take your child to school or the bus.  ? Go for a family bike ride after dinner instead of watching TV. Where can you learn more? Go to https://chmarshalleb.health-partners. org and sign in to your [employer] account.  Enter G533 in the [facility] box to learn more about \"A Healthy Lifestyle: Care Instructions. \"     If you do not have an account, please click on the \"Sign Up Now\" link. Current as of: February 9, 2022               Content Version: 13.3  © 5403-2420 Healthwise, Incorporated. Care instructions adapted under license by [facility] ([facility]). If you have questions about a medical condition or this instruction, always ask your healthcare professional. [address] any warranty or liability for your use of this information.

## 2022-07-13 NOTE — PROGRESS NOTES
Well Adult Note  Name: Rolanda Zuñiga Date: 2022   MRN: [id number] Sex: Female   Age: 22 y.o. Ethnicity: Non- / Non    : 2000 Race: White (non-)      Peggy Villafuerte is here for well adult exam.  History:    Environmental allergies   - several year h/o allergy symptoms including congestion, rhinorrhea, sneezing, dry, itchy eyes   - she takes Claritin daily and singulair when not controlled with claritin      Review of Systems   All other systems reviewed and are negative. No Known Allergies      Prior to Visit Medications    Medication Sig Taking?  Authorizing Provider   sertraline (ZOLOFT) 50 MG tablet  Yes Historical Provider, MD   montelukast (SINGULAIR) 10 MG tablet Take 1 tablet by mouth daily Yes JEFF Mantilla CNP   montelukast (SINGULAIR) 10 MG tablet Take 1 tablet by mouth daily Yes JEFF Steele CNP   norgestimate-ethinyl estradiol ([address]) 0.25-35 MG-MCG per tablet Take 1 tablet by mouth daily Yes JEFF Dsouza CNM   hydrOXYzine (ATARAX) 10 MG tablet Take 10 mg by mouth 3 times daily as needed for Itching  Yes Historical Provider, MD   Cetirizine HCl (ZYRTEC ALLERGY PO) Take by mouth Yes Historical Provider, MD   norgestimate-ethinyl estradiol ([address]) 0.25-35 MG-MCG per tablet Take 1 tablet by mouth daily  Adán Quezada, APRN - CNM         Past Medical History:   Diagnosis Date    Mild persistent asthma     Seasonal allergic rhinitis        Past Surgical History:   Procedure Laterality Date    WISDOM TOOTH EXTRACTION           Family History   Problem Relation Age of Onset    Allergies Mother     Cancer Father 50        brain    Cancer Other        Social History     Tobacco Use    Smoking status: Never Smoker    Smokeless tobacco: Never Used   Vaping Use    Vaping Use: Never used   Substance Use Topics    Alcohol use: Never    Drug use: Never       Objective   /68   Pulse 76   Temp 97.6 °F (36.4 °C) (Temporal) Ht 5' 3\" (1.6 m)   Wt 133 lb (60.3 kg)   SpO2 99%   BMI 23.56 kg/m²   Wt Readings from Last 3 Encounters:   07/13/22 133 lb (60.3 kg)   03/01/22 130 lb 4.8 oz (59.1 kg)   10/12/21 131 lb (59.4 kg)     There were no vitals filed for this visit. Physical Exam  Vitals and nursing note reviewed. Constitutional:       General: She is not in acute distress. Appearance: She is well-developed and normal weight. HENT:      Head: Normocephalic and atraumatic. Right Ear: Hearing, tympanic membrane, ear canal and external ear normal.      Left Ear: Hearing, tympanic membrane, ear canal and external ear normal.      Nose: Nose normal.      Mouth/Throat:      Lips: Pink. Mouth: Mucous membranes are moist.      Tongue: No lesions. Pharynx: Oropharynx is clear. Uvula midline. Eyes:      General: Lids are normal. Vision grossly intact. Gaze aligned appropriately. Extraocular Movements: Extraocular movements intact. Conjunctiva/sclera: Conjunctivae normal.      Pupils: Pupils are equal, round, and reactive to light. Neck:      Thyroid: No thyroid mass, thyromegaly or thyroid tenderness. Trachea: Trachea normal.   Cardiovascular:      Rate and Rhythm: Normal rate and regular rhythm. Pulses: Normal pulses. Heart sounds: No murmur heard. Pulmonary:      Effort: Pulmonary effort is normal.      Breath sounds: Normal breath sounds. No wheezing. Abdominal:      General: Abdomen is flat. Bowel sounds are normal.      Palpations: Abdomen is soft. Tenderness: There is no abdominal tenderness. Musculoskeletal:         General: No swelling or tenderness. Normal range of motion. Cervical back: Full passive range of motion without pain. Right lower leg: No edema. Left lower leg: No edema. Lymphadenopathy:      Cervical: No cervical adenopathy. Skin:     General: Skin is warm and dry. Capillary Refill: Capillary refill takes less than 2 seconds. Neurological:      General: No focal deficit present. Mental Status: She is alert and oriented to person, place, and time. Sensory: Sensation is intact. Motor: Motor function is intact. Coordination: Coordination is intact. Psychiatric:         Attention and Perception: Attention and perception normal.         Mood and Affect: Mood and affect normal.         Speech: Speech normal.         Behavior: Behavior is cooperative. Thought Content: Thought content normal.         Cognition and Memory: Cognition and memory normal.         Judgment: Judgment normal.           Assessment   Plan   1. Encounter for well adult exam without abnormal findings  -     Tdap, BOOSTRIX, (age 8 yrs+), IM  2. Immunization due  -     Tdap, BOOSTRIX, (age 8 yrs+), IM  3. Environmental allergies  -     montelukast (SINGULAIR) 10 MG tablet; Take 1 tablet by mouth daily, Disp-90 tablet, R-1Normal  -     montelukast (SINGULAIR) 10 MG tablet; Take 1 tablet by mouth daily, Disp-30 tablet, R-0Normal  4. Immunity status testing  -     Quantiferon (R) TB Gold, (Incubated); Future  5.  Depression screening negative  -     [place] DEPRESSION SCREEN ANNUAL         Personalized Preventive Plan   Current Health Maintenance Status  Immunization History   Administered Date(s) Administered    COVID[facility], Primary or Immunocompromised, (age 12y+), IM, 100 mcg/0.5mL 01/06/2021, 02/03/2021, 12/23/2021    DTaP 2001, 02/19/2002, 12/10/2003    DTaP vaccine 2001, 09/14/2006    HIB PRP-T (ActHIB, Hiberix) 2001, 02/19/2002, 03/17/2003    HPV Quadrivalent (Gardasil) 12/09/2011, 06/22/2012, 08/28/2013    Hepatitis A Ped/Adol (Havrix, Vaqta) 11/14/2006, 09/24/2007    Hepatitis B 2001, 2001, 2001, 09/15/2014    Hepatitis B Ped/Adol (Engerix-B, Recombivax HB) 09/05/2014    Hib (HbOC) 2001    Influenza Virus Vaccine 10/16/2020    Influenza Whole 11/14/2006, 10/23/2007, 11/28/2007, 11/20/2008, 09/04/2009, 11/09/2016    Influenza, MDCK Quadv, IM, PF (Flucelvax 2 yrs and older) 10/12/2021    Influenza, Brennen Cuna, IM, PF (6 mo and older Fluzone, Flulaval, Fluarix, and 3 yrs and older Afluria) 10/13/2017, 10/11/2019    MMR 08/30/2002, 01/06/2021    MMRV (ProQuad) 09/14/2006    Meningococcal MCV4P (Menactra) 08/29/2012, 10/13/2017    Pneumococcal Conjugate 13-valent (Suzzanne Marrow) 12/10/2002    Polio IPV (IPOL) 2001, 2001, 05/23/2002, 09/14/2006    Tdap (Boostrix, Adacel) 08/29/2012, 07/13/2022    Varicella (Varivax) 08/30/2002        Health Maintenance   Topic Date Due    HIV screen  Never done    Chlamydia screen  09/30/2020    Flu vaccine (1) 09/01/2022    Depression Screen  03/01/2023    DTaP/Tdap/Td vaccine (8 - Td or Tdap) 07/13/2032    Hepatitis A vaccine  Completed    Hepatitis B vaccine  Completed    Hib vaccine  Completed    HPV vaccine  Completed    Varicella vaccine  Completed    Meningococcal (ACWY) vaccine  Completed    COVID-19 Vaccine  Completed    Hepatitis C screen  Completed    Pneumococcal 0-64 years Vaccine  Aged Out     Recommendations for Preventive Services Due: see orders and patient instructions/AVS.    Return in about 1 year (around 7/13/2023).

## 2022-07-16 LAB
QUANTI TB GOLD PLUS: NEGATIVE
QUANTI TB1 MINUS NIL: 0 IU/ML (ref 0–0.34)
QUANTI TB2 MINUS NIL: 0 IU/ML (ref 0–0.34)
QUANTIFERON MITOGEN: 2.91 IU/ML
QUANTIFERON NIL: 0.01 IU/ML

## 2022-08-07 DIAGNOSIS — Z91.09 ENVIRONMENTAL ALLERGIES: ICD-10-CM

## 2022-08-08 RX ORDER — MONTELUKAST SODIUM 10 MG/1
TABLET ORAL
Qty: 30 TABLET | Refills: 0 | OUTPATIENT
Start: 2022-08-08

## 2023-01-09 ENCOUNTER — HOSPITAL ENCOUNTER (OUTPATIENT)
Age: 23
Setting detail: SPECIMEN
Discharge: HOME | End: 2023-01-09

## 2023-01-09 LAB
ABSOLUTE EOS #: 0.22 K/UL (ref 0–0.44)
ABSOLUTE IMMATURE GRANULOCYTE: <0.03 K/UL (ref 0–0.3)
ABSOLUTE LYMPH #: 1.49 K/UL (ref 1.1–3.7)
ABSOLUTE MONO #: 0.31 K/UL (ref 0.1–1.4)
ALBUMIN SERPL-MCNC: 3.9 G/DL (ref 3.5–5.2)
ALBUMIN/GLOBULIN RATIO: 1.1 (ref 1–2.5)
ALP BLD-CCNC: 50 U/L (ref 35–104)
ALT SERPL-CCNC: 11 U/L (ref 5–33)
ANION GAP SERPL CALCULATED.3IONS-SCNC: 11 MMOL/L (ref 9–17)
AST SERPL-CCNC: 19 U/L
BASOPHILS # BLD: 1 % (ref 0–2)
BASOPHILS ABSOLUTE: 0.04 K/UL (ref 0–0.2)
BILIRUB SERPL-MCNC: 0.3 MG/DL (ref 0.3–1.2)
BUN BLDV-MCNC: 10 MG/DL (ref 6–20)
CALCIUM SERPL-MCNC: 9.6 MG/DL (ref 8.6–10.4)
CHLORIDE BLD-SCNC: 103 MMOL/L (ref 98–107)
CO2: 24 MMOL/L (ref 20–31)
CREAT SERPL-MCNC: 0.69 MG/DL (ref 0.5–0.9)
EOSINOPHILS RELATIVE PERCENT: 5 % (ref 1–4)
GFR SERPL CREATININE-BSD FRML MDRD: >60 ML/MIN/1.73M2
GLUCOSE BLD-MCNC: 84 MG/DL (ref 70–99)
HCT VFR BLD CALC: 38.4 % (ref 36.3–47.1)
HEMOGLOBIN: 12.4 G/DL (ref 11.9–15.1)
IMMATURE GRANULOCYTES: 0 %
LYMPHOCYTES # BLD: 32 % (ref 25–45)
MCH RBC QN AUTO: 27.5 PG (ref 25.2–33.5)
MCHC RBC AUTO-ENTMCNC: 32.3 G/DL (ref 28.4–34.8)
MCV RBC AUTO: 85.1 FL (ref 82.6–102.9)
MONOCYTES # BLD: 7 % (ref 2–8)
NRBC AUTOMATED: 0 PER 100 WBC
PDW BLD-RTO: 11.8 % (ref 11.8–14.4)
PLATELET # BLD: 426 K/UL (ref 138–453)
PMV BLD AUTO: 8.9 FL (ref 8.1–13.5)
POTASSIUM SERPL-SCNC: 4.5 MMOL/L (ref 3.7–5.3)
RBC # BLD: 4.51 M/UL (ref 3.95–5.11)
SEG NEUTROPHILS: 55 % (ref 34–64)
SEGMENTED NEUTROPHILS ABSOLUTE COUNT: 2.57 K/UL (ref 1.5–8.1)
SODIUM BLD-SCNC: 138 MMOL/L (ref 135–144)
THYROXINE, FREE: 1.04 NG/DL (ref 0.93–1.7)
TOTAL PROTEIN: 7.4 G/DL (ref 6.4–8.3)
TSH SERPL DL<=0.05 MIU/L-ACNC: 0.9 UIU/ML (ref 0.3–5)
VITAMIN D 25-HYDROXY: 36 NG/ML
WBC # BLD: 4.6 K/UL (ref 4.5–13.5)

## 2023-02-16 ENCOUNTER — TELEPHONE (OUTPATIENT)
Dept: OBGYN CLINIC | Age: 23
End: 2023-02-16

## 2023-02-16 DIAGNOSIS — Z30.41 ENCOUNTER FOR SURVEILLANCE OF CONTRACEPTIVE PILLS: ICD-10-CM

## 2023-02-16 DIAGNOSIS — N94.6 DYSMENORRHEA: ICD-10-CM

## 2023-02-16 RX ORDER — NORGESTIMATE AND ETHINYL ESTRADIOL 0.25-0.035
1 KIT ORAL DAILY
Qty: 90 TABLET | Refills: 0 | Status: SHIPPED | OUTPATIENT
Start: 2023-02-16

## 2023-02-16 NOTE — TELEPHONE ENCOUNTER
Patient needs a refill on her OCP- mono-linya 025-35 mg-mcg po daily and patient does not want the generic. Last annual-3/1/22 with you and new annual scheduled for 2/21/23.  Please advise, thanks

## 2023-02-17 DIAGNOSIS — N94.6 DYSMENORRHEA: ICD-10-CM

## 2023-02-17 DIAGNOSIS — Z30.41 ENCOUNTER FOR SURVEILLANCE OF CONTRACEPTIVE PILLS: ICD-10-CM

## 2023-02-17 RX ORDER — NORGESTIMATE AND ETHINYL ESTRADIOL 0.25-0.035
1 KIT ORAL DAILY
Qty: 90 TABLET | Refills: 0 | Status: CANCELLED | OUTPATIENT
Start: 2023-02-17

## 2023-02-23 DIAGNOSIS — Z30.41 ENCOUNTER FOR SURVEILLANCE OF CONTRACEPTIVE PILLS: ICD-10-CM

## 2023-02-23 DIAGNOSIS — N94.6 DYSMENORRHEA: ICD-10-CM

## 2023-02-23 RX ORDER — NORGESTIMATE AND ETHINYL ESTRADIOL 0.25-0.035
1 KIT ORAL DAILY
Qty: 90 TABLET | Refills: 3 | Status: SHIPPED | OUTPATIENT
Start: 2023-02-23

## 2023-02-27 DIAGNOSIS — N94.6 DYSMENORRHEA: ICD-10-CM

## 2023-02-27 DIAGNOSIS — Z30.41 ENCOUNTER FOR SURVEILLANCE OF CONTRACEPTIVE PILLS: ICD-10-CM

## 2023-02-27 RX ORDER — NORGESTIMATE AND ETHINYL ESTRADIOL 0.25-0.035
1 KIT ORAL DAILY
Qty: 90 TABLET | Refills: 3 | Status: SHIPPED | OUTPATIENT
Start: 2023-02-27

## 2023-03-08 ENCOUNTER — TELEPHONE (OUTPATIENT)
Dept: OBGYN CLINIC | Age: 23
End: 2023-03-08

## 2023-03-08 RX ORDER — NORGESTIMATE AND ETHINYL ESTRADIOL 0.25-0.035
1 KIT ORAL DAILY
Qty: 1 PACKET | Refills: 0 | Status: SHIPPED | OUTPATIENT
Start: 2023-03-08

## 2023-03-08 NOTE — TELEPHONE ENCOUNTER
[facility] no longer fills [place] TAB (ANGELICA 1) It needs to got to a local pharmacy.  Please advise

## 2023-03-21 ENCOUNTER — OFFICE VISIT (OUTPATIENT)
Dept: OBGYN CLINIC | Age: 23
End: 2023-03-21
Payer: COMMERCIAL

## 2023-03-21 ENCOUNTER — HOSPITAL ENCOUNTER (OUTPATIENT)
Age: 23
Setting detail: SPECIMEN
Discharge: HOME | End: 2023-03-21

## 2023-03-21 VITALS
HEIGHT: 63 IN | SYSTOLIC BLOOD PRESSURE: 114 MMHG | DIASTOLIC BLOOD PRESSURE: 78 MMHG | WEIGHT: 130 LBS | BODY MASS INDEX: 23.04 KG/M2

## 2023-03-21 DIAGNOSIS — Z30.41 ENCOUNTER FOR BIRTH CONTROL PILLS MAINTENANCE: ICD-10-CM

## 2023-03-21 DIAGNOSIS — R53.83 OTHER FATIGUE: ICD-10-CM

## 2023-03-21 DIAGNOSIS — Z01.419 WELL WOMAN EXAM WITH ROUTINE GYNECOLOGICAL EXAM: Primary | ICD-10-CM

## 2023-03-21 PROCEDURE — 99395 PREV VISIT EST AGE 18-39: CPT | Performed by: CLINICAL NURSE SPECIALIST

## 2023-03-21 RX ORDER — NORGESTIMATE AND ETHINYL ESTRADIOL 0.25-0.035
1 KIT ORAL DAILY
Qty: 1 PACKET | Refills: 11 | Status: SHIPPED | OUTPATIENT
Start: 2023-03-21

## 2023-03-21 NOTE — PROGRESS NOTES
discussed. Neurological:  The patient is alert and oriented to time,place, person, and situation    Skin:  A brief inspection of the skin revealed no rashes or lesions. Neck:  The neck was supple. Respiratory: There was unlabored respiratory effort. Lungs clear to ascultation. Cardiovascular: The patients extremities were without calf tenderness or edema. Heart with a regular rate and rhythm. Abdomen: The abdomen was soft and non-tender with no guarding, rebound or rigidity. No hernias were appreciated. Breast:   The patients breasts were symmetrical.  There were no masses, discharge or retractions noted. Self breast exams were reviewed. Pelvic Exam:  The external genitalia was with a normal appearance. The vaginal vault was normal. There were no cystocele, rectocele, or enterocele appreciated. There was no vaginal discharge. The cervix was without lesions. There was no cervical motion tenderness. The uterus was mobile, midline and regular. The adnexa no fullness, tenderness or masses appreciated. ASSESSMENT: Normal annual well woman exam    25 y.o. Female; Annual   Diagnosis Orders   1. Well woman exam with routine gynecological exam  PAP Smear      2. Other fatigue  Lisha Hurst Virus      3. Encounter for birth control pills maintenance                       PLAN:  - Discussed new papsmear guidelines. - Birth control Discussed. - Smoking risk factors Discussed  - Diet and exercise reviewed. - Routine healthmaintenance per patients PCP.  - Return to clinic in 1 year or earlier with questions, problems, concerns. Return for 1 year for Annual and as needed.         Electronically signed by JEFF Hunt CNP on 3/21/2023 at 2:15 PM

## 2023-03-29 LAB — CYTOLOGY REPORT: NORMAL

## 2024-03-22 ENCOUNTER — OFFICE VISIT (OUTPATIENT)
Dept: OBGYN CLINIC | Age: 24
End: 2024-03-22

## 2024-03-22 VITALS
BODY MASS INDEX: 23.42 KG/M2 | HEART RATE: 73 BPM | HEIGHT: 63 IN | SYSTOLIC BLOOD PRESSURE: 102 MMHG | WEIGHT: 132.2 LBS | DIASTOLIC BLOOD PRESSURE: 60 MMHG

## 2024-03-22 DIAGNOSIS — N94.6 DYSMENORRHEA: ICD-10-CM

## 2024-03-22 DIAGNOSIS — Z30.41 ENCOUNTER FOR SURVEILLANCE OF CONTRACEPTIVE PILLS: ICD-10-CM

## 2024-03-22 DIAGNOSIS — Z00.00 ENCOUNTER FOR WELL WOMAN EXAM WITHOUT GYNECOLOGICAL EXAM: Primary | ICD-10-CM

## 2024-03-22 RX ORDER — NORGESTIMATE AND ETHINYL ESTRADIOL 0.25-0.035
1 KIT ORAL DAILY
Qty: 90 TABLET | Refills: 3 | Status: SHIPPED | OUTPATIENT
Start: 2024-03-22

## 2024-03-22 SDOH — ECONOMIC STABILITY: HOUSING INSECURITY
IN THE LAST 12 MONTHS, WAS THERE A TIME WHEN YOU DID NOT HAVE A STEADY PLACE TO SLEEP OR SLEPT IN A SHELTER (INCLUDING NOW)?: NO

## 2024-03-22 SDOH — ECONOMIC STABILITY: FOOD INSECURITY: WITHIN THE PAST 12 MONTHS, YOU WORRIED THAT YOUR FOOD WOULD RUN OUT BEFORE YOU GOT MONEY TO BUY MORE.: NEVER TRUE

## 2024-03-22 SDOH — ECONOMIC STABILITY: FOOD INSECURITY: WITHIN THE PAST 12 MONTHS, THE FOOD YOU BOUGHT JUST DIDN'T LAST AND YOU DIDN'T HAVE MONEY TO GET MORE.: NEVER TRUE

## 2024-03-22 SDOH — ECONOMIC STABILITY: INCOME INSECURITY: HOW HARD IS IT FOR YOU TO PAY FOR THE VERY BASICS LIKE FOOD, HOUSING, MEDICAL CARE, AND HEATING?: NOT HARD AT ALL

## 2024-03-22 ASSESSMENT — ANXIETY QUESTIONNAIRES
IF YOU CHECKED OFF ANY PROBLEMS ON THIS QUESTIONNAIRE, HOW DIFFICULT HAVE THESE PROBLEMS MADE IT FOR YOU TO DO YOUR WORK, TAKE CARE OF THINGS AT HOME, OR GET ALONG WITH OTHER PEOPLE: NOT DIFFICULT AT ALL
4. TROUBLE RELAXING: NOT AT ALL
2. NOT BEING ABLE TO STOP OR CONTROL WORRYING: NOT AT ALL
5. BEING SO RESTLESS THAT IT IS HARD TO SIT STILL: NOT AT ALL
6. BECOMING EASILY ANNOYED OR IRRITABLE: NOT AT ALL
GAD7 TOTAL SCORE: 0
1. FEELING NERVOUS, ANXIOUS, OR ON EDGE: NOT AT ALL
7. FEELING AFRAID AS IF SOMETHING AWFUL MIGHT HAPPEN: NOT AT ALL
3. WORRYING TOO MUCH ABOUT DIFFERENT THINGS: NOT AT ALL

## 2024-03-22 ASSESSMENT — PATIENT HEALTH QUESTIONNAIRE - PHQ9
2. FEELING DOWN, DEPRESSED OR HOPELESS: NOT AT ALL
SUM OF ALL RESPONSES TO PHQ QUESTIONS 1-9: 0

## 2024-03-22 ASSESSMENT — ENCOUNTER SYMPTOMS
EYES NEGATIVE: 1
GASTROINTESTINAL NEGATIVE: 1
RESPIRATORY NEGATIVE: 1

## 2024-03-22 NOTE — PROGRESS NOTES
[facility]  [address]   [address]  [facility] [address]  Dept: [phone]    Patient Name: Yvonne Rushing  Patient Age: 23 y.o.  Date of Visit: 3/22/2024    Subjective  Chief Complaint   Patient presents with    Annual Exam     HPI:  Yvonne Rushing is a 23 y.o. female who arrives to office as an established patient for annual exam and med refill .     Patient denies concerns today.   Patient reports is sexually active with 1 male partner(s).   Patient denies pain with sex.  Patient denies a history of sexually transmitted infection(s).  Patient does not want screening for sexually transmitted infection(s).  Reports is  on contraception - OCP for menses regulation - would like refills.  Is a night shift RN      Review of Systems   Constitutional: Negative.  Negative for chills, fatigue, fever and unexpected weight change.   HENT: Negative.     Eyes: Negative.    Respiratory: Negative.     Cardiovascular: Negative.    Gastrointestinal: Negative.    Endocrine: Negative.  Negative for cold intolerance and heat intolerance.   Genitourinary:  Positive for menstrual problem (improved with OCP). Negative for dyspareunia, dysuria, flank pain, frequency, pelvic pain, vaginal discharge and vaginal pain.   Musculoskeletal: Negative.    Skin: Negative.    Allergic/Immunologic: Negative.    Neurological: Negative.    Hematological: Negative.    Psychiatric/Behavioral: Negative.         Preventive Health Screening:   Date of last pap: normal  History of abnormal pap: denies          HPV typing/date: n/a   Date of last mammogram: N/A   Date of last DEXA scan: N/A   Date of last colonoscopy (start age 45): N/A    History of Gestational Diabetes: No     If Yes, Glucose screening ordered: N/A    Preventive screening: Yes, with PCP    Family history of Breast, Ovarian, Colon or Uterine Cancer:  negative      If Yes see scanned

## 2024-03-27 DIAGNOSIS — Z30.41 ENCOUNTER FOR SURVEILLANCE OF CONTRACEPTIVE PILLS: ICD-10-CM

## 2024-03-27 DIAGNOSIS — N94.6 DYSMENORRHEA: ICD-10-CM

## 2024-03-27 RX ORDER — NORGESTIMATE AND ETHINYL ESTRADIOL 0.25-0.035
1 KIT ORAL DAILY
Qty: 28 TABLET | OUTPATIENT
Start: 2024-03-27

## 2024-07-17 DIAGNOSIS — N94.6 DYSMENORRHEA: ICD-10-CM

## 2024-07-17 DIAGNOSIS — Z30.41 ENCOUNTER FOR SURVEILLANCE OF CONTRACEPTIVE PILLS: ICD-10-CM

## 2024-07-18 RX ORDER — NORGESTIMATE AND ETHINYL ESTRADIOL 0.25-0.035
1 KIT ORAL DAILY
Qty: 84 TABLET | Refills: 2 | Status: SHIPPED | OUTPATIENT
Start: 2024-07-18

## 2024-07-18 NOTE — TELEPHONE ENCOUNTER
At her annual I sent a full years worth of refills, can you call the pharmacy and see if this refill is needed? Thanks

## 2024-09-17 DIAGNOSIS — Z30.41 ENCOUNTER FOR SURVEILLANCE OF CONTRACEPTIVE PILLS: ICD-10-CM

## 2024-09-17 DIAGNOSIS — N94.6 DYSMENORRHEA: ICD-10-CM

## 2024-09-18 RX ORDER — NORGESTIMATE AND ETHINYL ESTRADIOL 0.25-0.035
1 KIT ORAL DAILY
Qty: 84 TABLET | Refills: 1 | Status: SHIPPED | OUTPATIENT
Start: 2024-09-18